# Patient Record
Sex: FEMALE | URBAN - METROPOLITAN AREA
[De-identification: names, ages, dates, MRNs, and addresses within clinical notes are randomized per-mention and may not be internally consistent; named-entity substitution may affect disease eponyms.]

---

## 2023-02-05 ENCOUNTER — NURSE TRIAGE (OUTPATIENT)
Dept: NURSING | Facility: CLINIC | Age: 28
End: 2023-02-05

## 2023-02-05 NOTE — TELEPHONE ENCOUNTER
Reason for Disposition    [1] Redness or swelling on the cheek, forehead or around the eye AND [2] no fever    Additional Information    Negative: SEVERE difficulty breathing (e.g., struggling for each breath, speaks in single words)    Negative: Sounds like a life-threatening emergency to the triager    Negative: [1] Sinus infection AND [2] taking an antibiotic AND [3] symptoms continue    Negative: [1] Difficulty breathing AND [2] not from stuffy nose (e.g., not relieved by cleaning out the nose)    Negative: [1] SEVERE headache AND [2] fever    Negative: [1] Redness or swelling on the cheek, forehead or around the eye AND [2] fever    Negative: Fever > 104 F (40 C)    Negative: Patient sounds very sick or weak to the triager    Negative: [1] SEVERE pain AND [2] not improved 2 hours after pain medicine    Protocols used: SINUS PAIN OR CONGESTION-A-  Nurse Triage SBAR    Is this a 2nd Level Triage? YES, LICENSED PRACTITIONER REVIEW IS REQUIRED    Situation:   The patient is complaining sinus cold    History of sinusitis    Headache congestion, using the neti-pot   and takine flonase and tylenol     dark yellow green nasal secrections       The patient is requesting antibiotics for possible sinus infection    Background:   She says she has a history of sinus infections    No fever    Assessment:   Upper respiratory infection    Protocol Recommended Disposition:   See HCP Within 4 Hours (Or PCP Triage)    Recommendation:   continue with care advice, wait for providers response  Provider Recommendation Follow Up:   Reached patient/caregiver. Informed of provider's recommendations. Patient verbalized understanding and agrees with the plan.         Called on call provider for January, no February schedule Dr. Marquez    Does the patient meet one of the following criteria for ADS visit consideration? 16+ years old, with an [facility] PCP     TIP  Providers, please consider if this condition is appropriate for management at  one of our [facility] and [facility] sites.     If patient is a good candidate, please use dotphrase <dot>triageresponse and select Refer to ADS to document.